# Patient Record
Sex: FEMALE | Race: WHITE | NOT HISPANIC OR LATINO | Employment: OTHER | URBAN - METROPOLITAN AREA
[De-identification: names, ages, dates, MRNs, and addresses within clinical notes are randomized per-mention and may not be internally consistent; named-entity substitution may affect disease eponyms.]

---

## 2019-05-16 NOTE — PATIENT DISCUSSION
CATARACT, OU - VISUALLY SIGNIFICANT. SCHEDULE PHACO WITH IOL OS  FIRST THEN OD  IF VISUAL SYMPTOMS PERSIST. GLASSES RX TO BE GIVEN BY [facility] TO FILL IF DESIRES IN THE EVENT PATIENT DOES NOT PROCEED WITH SURGERY.

## 2019-05-16 NOTE — PATIENT DISCUSSION
New Prescription: prednisolone acetate (prednisolone acetate): drops,suspension: 1% 1 drop three times a day as directed into left eye 05-

## 2019-05-16 NOTE — PATIENT DISCUSSION
Surgery Counseling:  I have discussed the option of glasses versus cataract surgery versus following, It was explained that when vision no longer meets the patient's visual needs and a new prescription for glasses is not likely to improve the patient's visual symptoms, the option of cataract surgery is a reasonable next step. It was explained that there is no guarantee that removing the cataract will improve their visual symptoms; however, it is believed that the cataract is contributing to the patient's visual impairment and surgery may noticeably improve both the visual and functional status of the patient. After this discussion, the patient desires to proceed with cataract surgery with implantation of an intraocular lens to improve their vision for glare symptoms when driving at night.

## 2019-05-16 NOTE — PATIENT DISCUSSION
New Prescription: Prolensa (bromfenac): drops: 0.07% 1 drop every morning as directed into left eye 05-

## 2019-06-12 NOTE — PATIENT DISCUSSION
CATARACT, OD: VISUALLY SIGNIFICANT. SCHEDULE PHACO WITH IOL. IOL MASTER REVIEWED AND INTERPRETED ON H&amp;P SHEET FOR OD TODAY. CONSENTS FOR SECOND EYE DISCUSSED WITH PATIENT TODAY. PATIENT UNDERSTANDS AND HAS NO FURTHER QUESTIONS.

## 2019-06-12 NOTE — PATIENT DISCUSSION
Continue: Besivance (besifloxacin): drops,suspension: 0.6% 1 drop three times a day as directed into left eye 05-

## 2019-06-12 NOTE — PATIENT DISCUSSION
Continue: prednisolone acetate (prednisolone acetate): drops,suspension: 1% 1 drop three times a day as directed into left eye 05-

## 2019-06-12 NOTE — PATIENT DISCUSSION
New Prescription: Vandana Zambrano (brinzolamide-brimonidine): drops,suspension: 1-0.2% 1 drop twice a day as directed into left eye

## 2019-06-12 NOTE — PATIENT DISCUSSION
-Refractive Error Counseling (Declined): The patient has declined the option to have their refractive error corrected at the time of cataract surgery. It was explained to the patient that there is a high probability that they will need glasses for both distance and near vision. It was also explained to the patient that the decision to not have their refractive error corrected at the time of cataract surgery is a matter of convenience, not quality of vision. 112

## 2019-06-12 NOTE — PATIENT DISCUSSION
S/P PHACO W/IOL,OS:  ELEVATED IOP. INSTILLED ONE DROP OF COMBIGAN @ 9:37AM. REPEAT IOP CHECK BY TECHNICIAN @9:50. IOP REDUCED WITH TOPICAL TREATMENT IN OFFICE. ADD SIMBRINZA, 1 GTT BID OS TO CONTROL IOP UNTIL NEXT POST OP VISIT. CONTINUE TO TAPER DROPS AS DIRECTED. DISCONTINUE ANTIBIOTIC IN 1 WEEK. RETURN FOR FOLLOW-UP AS SCHEDULED.

## 2019-06-12 NOTE — PATIENT DISCUSSION
Surgery 2nd Eye Counseling: The patient has noticed an improvement in their visual symptoms in the operative eye. The patient complains of decreased vision in the fellow eye when bowling &amp; driving at night. It was explained to the patient that the decision to proceed with cataract surgery in the fellow eye is entirely a separate decision from the surgical eye. All of the same risks, benefits and alternatives ere reviewed with the patient again. The patient does feel the vision in the non-operative eye is limiting their daily activities and elects to proceed with surgery in the cataract eye.

## 2019-06-26 NOTE — PATIENT DISCUSSION
CONTINUE TO TAPER OTHER TWO DROPS AS DIRECTED BY [facility]. OKAY TO USE REFRESH PF OPTIVE PRN. FOLLOW-UP WITH [facility] IN ONE WEEK.

## 2019-06-26 NOTE — PATIENT DISCUSSION
S/P PHACO W/IOL,OD:  ELEVATED IOP. INSTILLED ONE DROP OF SIMBRINZA  @ 11:01 AM. REPEAT IOP CHECK BY TECHNICIAN @11:23 am. IOP NOT REDUCED SIGNIFICANTLY ENOUGH WITH TOPICAL TREATMENT IN OFFICE. FLUID RELEASED FROM AB EXTERNA INCISION AT SLIT LAMP WITHOUT DIFFICULTY. REPEAT IOP CHECK BY DR. Elina Kan ([id number]). INSTILLED ONE DROP OF ANTIBIOTIC IMMEDIATELY. ADD COMBIGAN BID OD TO CONTROL IOP UNTIL FURTHER INSTRUCTIONS FROM [facility].

## 2020-09-10 ENCOUNTER — PREPPED CHART (OUTPATIENT)
Dept: URBAN - METROPOLITAN AREA CLINIC 30 | Facility: CLINIC | Age: 80
End: 2020-09-10

## 2021-01-04 ASSESSMENT — TONOMETRY
OD_IOP_MMHG: 11
OS_IOP_MMHG: 16

## 2021-01-06 ENCOUNTER — ESTABLISHED PATIENT (OUTPATIENT)
Dept: URBAN - METROPOLITAN AREA CLINIC 30 | Facility: CLINIC | Age: 81
End: 2021-01-06

## 2021-01-06 DIAGNOSIS — H40.1130: ICD-10-CM

## 2021-01-06 PROCEDURE — 92020 GONIOSCOPY: CPT

## 2021-01-06 PROCEDURE — 0509T PATTERN ERG W/I&R: CPT

## 2021-01-06 PROCEDURE — 99213 OFFICE O/P EST LOW 20 MIN: CPT

## 2021-01-06 ASSESSMENT — TONOMETRY
OD_IOP_MMHG: 14
OS_IOP_MMHG: 18
OD_IOP_MMHG: 18
OS_IOP_MMHG: 19

## 2021-04-27 ENCOUNTER — ESTABLISHED PATIENT (OUTPATIENT)
Dept: URBAN - METROPOLITAN AREA CLINIC 30 | Facility: CLINIC | Age: 81
End: 2021-04-27

## 2021-04-27 DIAGNOSIS — H02.88A: ICD-10-CM

## 2021-04-27 DIAGNOSIS — H40.1131: ICD-10-CM

## 2021-04-27 PROCEDURE — 92133 CPTRZD OPH DX IMG PST SGM ON: CPT

## 2021-04-27 PROCEDURE — 99213 OFFICE O/P EST LOW 20 MIN: CPT

## 2021-04-27 ASSESSMENT — TONOMETRY
OS_IOP_MMHG: 15
OD_IOP_MMHG: 14

## 2021-04-27 ASSESSMENT — VISUAL ACUITY
OD_CC: 20/20
OS_CC: 20/20--
OU_CC: 20/20

## 2021-10-26 ENCOUNTER — ESTABLISHED COMPREHENSIVE EXAM (OUTPATIENT)
Dept: URBAN - METROPOLITAN AREA CLINIC 30 | Facility: CLINIC | Age: 81
End: 2021-10-26

## 2021-10-26 DIAGNOSIS — H02.88A: ICD-10-CM

## 2021-10-26 DIAGNOSIS — Z96.1: ICD-10-CM

## 2021-10-26 DIAGNOSIS — H35.3131: ICD-10-CM

## 2021-10-26 DIAGNOSIS — H40.1131: ICD-10-CM

## 2021-10-26 DIAGNOSIS — H43.813: ICD-10-CM

## 2021-10-26 DIAGNOSIS — H00.13: ICD-10-CM

## 2021-10-26 DIAGNOSIS — H02.88B: ICD-10-CM

## 2021-10-26 PROCEDURE — 92250 FUNDUS PHOTOGRAPHY W/I&R: CPT

## 2021-10-26 PROCEDURE — 92083 EXTENDED VISUAL FIELD XM: CPT

## 2021-10-26 PROCEDURE — 99214 OFFICE O/P EST MOD 30 MIN: CPT

## 2021-10-26 ASSESSMENT — TONOMETRY
OS_IOP_MMHG: 16
OD_IOP_MMHG: 13
OD_IOP_MMHG: 12
OS_IOP_MMHG: 16

## 2021-10-26 ASSESSMENT — VISUAL ACUITY
OD_SC: 20/25+1
OS_SC: 20/25

## 2022-02-03 ENCOUNTER — COMPREHENSIVE EXAM (OUTPATIENT)
Dept: URBAN - METROPOLITAN AREA CLINIC 30 | Facility: CLINIC | Age: 82
End: 2022-02-03

## 2022-02-03 DIAGNOSIS — H43.813: ICD-10-CM

## 2022-02-03 DIAGNOSIS — Z96.1: ICD-10-CM

## 2022-02-03 DIAGNOSIS — H02.88A: ICD-10-CM

## 2022-02-03 DIAGNOSIS — H35.3131: ICD-10-CM

## 2022-02-03 DIAGNOSIS — H40.1131: ICD-10-CM

## 2022-02-03 DIAGNOSIS — H02.88B: ICD-10-CM

## 2022-02-03 PROCEDURE — 92083 EXTENDED VISUAL FIELD XM: CPT

## 2022-02-03 PROCEDURE — 99214 OFFICE O/P EST MOD 30 MIN: CPT

## 2022-02-03 PROCEDURE — 92133 CPTRZD OPH DX IMG PST SGM ON: CPT

## 2022-02-03 ASSESSMENT — TONOMETRY
OD_IOP_MMHG: 11
OS_IOP_MMHG: 16

## 2022-02-03 ASSESSMENT — VISUAL ACUITY
OD_CC: 20/20-1
OS_CC: 20/20-2

## 2022-09-01 ENCOUNTER — COMPREHENSIVE EXAM (OUTPATIENT)
Dept: URBAN - METROPOLITAN AREA CLINIC 30 | Facility: CLINIC | Age: 82
End: 2022-09-01

## 2022-09-01 DIAGNOSIS — H43.813: ICD-10-CM

## 2022-09-01 DIAGNOSIS — H02.88A: ICD-10-CM

## 2022-09-01 DIAGNOSIS — H40.1131: ICD-10-CM

## 2022-09-01 DIAGNOSIS — H02.88B: ICD-10-CM

## 2022-09-01 DIAGNOSIS — H35.373: ICD-10-CM

## 2022-09-01 DIAGNOSIS — H35.3131: ICD-10-CM

## 2022-09-01 PROCEDURE — 99214 OFFICE O/P EST MOD 30 MIN: CPT

## 2022-09-01 PROCEDURE — 92083 EXTENDED VISUAL FIELD XM: CPT

## 2022-09-01 PROCEDURE — 92250 FUNDUS PHOTOGRAPHY W/I&R: CPT

## 2022-09-01 ASSESSMENT — VISUAL ACUITY
OS_CC: 20/20-1
OD_CC: 20/20-2

## 2022-09-01 ASSESSMENT — TONOMETRY
OS_IOP_MMHG: 13
OD_IOP_MMHG: 12

## 2023-05-20 NOTE — PATIENT DISCUSSION
New Prescription: Besivance (besifloxacin): drops,suspension: 0.6% 1 drop three times a day as directed into left eye 05- No

## 2023-05-30 ASSESSMENT — KERATOMETRY
OD_K1POWER_DIOPTERS: 43.00
OD_K2POWER_DIOPTERS: 41.50
OS_K1POWER_DIOPTERS: 44.00
OS_AXISANGLE2_DEGREES: 80
OS_AXISANGLE_DEGREES: 170
OS_K2POWER_DIOPTERS: 41.75
OD_AXISANGLE2_DEGREES: 95
OD_AXISANGLE_DEGREES: 5

## 2023-06-06 ENCOUNTER — ESTABLISHED PATIENT (OUTPATIENT)
Facility: LOCATION | Age: 83
End: 2023-06-06

## 2023-06-06 DIAGNOSIS — H35.373: ICD-10-CM

## 2023-06-06 DIAGNOSIS — H35.3131: ICD-10-CM

## 2023-06-06 PROCEDURE — 0509T PATTERN ERG W/I&R: CPT

## 2023-06-06 PROCEDURE — 92134 CPTRZ OPH DX IMG PST SGM RTA: CPT

## 2023-06-06 PROCEDURE — 99213 OFFICE O/P EST LOW 20 MIN: CPT

## 2023-06-06 ASSESSMENT — VISUAL ACUITY
OD_CC: 20/20-2
OS_CC: 20/25+2

## 2023-06-06 ASSESSMENT — TONOMETRY
OD_IOP_MMHG: 14
OS_IOP_MMHG: 16

## 2023-09-07 ENCOUNTER — FOLLOW UP (OUTPATIENT)
Facility: LOCATION | Age: 83
End: 2023-09-07

## 2023-09-07 DIAGNOSIS — H43.813: ICD-10-CM

## 2023-09-07 DIAGNOSIS — H35.373: ICD-10-CM

## 2023-09-07 DIAGNOSIS — H02.88A: ICD-10-CM

## 2023-09-07 DIAGNOSIS — H40.1131: ICD-10-CM

## 2023-09-07 DIAGNOSIS — H35.3131: ICD-10-CM

## 2023-09-07 DIAGNOSIS — D31.31: ICD-10-CM

## 2023-09-07 PROCEDURE — 92134 CPTRZ OPH DX IMG PST SGM RTA: CPT

## 2023-09-07 PROCEDURE — 92014 COMPRE OPH EXAM EST PT 1/>: CPT

## 2023-09-07 PROCEDURE — 92083 EXTENDED VISUAL FIELD XM: CPT

## 2023-09-07 ASSESSMENT — VISUAL ACUITY
OS_CC: 20/20-1
OD_CC: J1+
OD_CC: 20/20-1
OS_CC: J1+

## 2023-09-07 ASSESSMENT — KERATOMETRY
OS_AXISANGLE2_DEGREES: 80
OD_K1POWER_DIOPTERS: 43.00
OD_AXISANGLE_DEGREES: 5
OS_K1POWER_DIOPTERS: 44.00
OS_AXISANGLE_DEGREES: 170
OS_K2POWER_DIOPTERS: 41.75
OD_AXISANGLE2_DEGREES: 95
OD_K2POWER_DIOPTERS: 41.50

## 2023-09-07 ASSESSMENT — TONOMETRY
OS_IOP_MMHG: 12
OD_IOP_MMHG: 11

## 2023-11-14 ENCOUNTER — ESTABLISHED PATIENT (OUTPATIENT)
Dept: URBAN - METROPOLITAN AREA CLINIC 44 | Facility: CLINIC | Age: 83
End: 2023-11-14

## 2023-11-14 DIAGNOSIS — C44.1122: ICD-10-CM

## 2023-11-14 PROCEDURE — 99213 OFFICE O/P EST LOW 20 MIN: CPT

## 2023-11-14 PROCEDURE — 92285 EXTERNAL OCULAR PHOTOGRAPHY: CPT

## 2023-11-14 ASSESSMENT — KERATOMETRY
OS_K2POWER_DIOPTERS: 41.75
OD_AXISANGLE_DEGREES: 5
OD_K2POWER_DIOPTERS: 41.50
OS_AXISANGLE_DEGREES: 170
OD_AXISANGLE2_DEGREES: 95
OD_K1POWER_DIOPTERS: 43.00
OS_AXISANGLE2_DEGREES: 80
OS_K1POWER_DIOPTERS: 44.00

## 2023-11-14 ASSESSMENT — VISUAL ACUITY
OS_CC: 20/20-1
OD_CC: 20/20-1

## 2024-01-16 ENCOUNTER — FOLLOW UP (OUTPATIENT)
Facility: LOCATION | Age: 84
End: 2024-01-16

## 2024-01-16 DIAGNOSIS — H40.1131: ICD-10-CM

## 2024-01-16 DIAGNOSIS — H47.20: ICD-10-CM

## 2024-01-16 PROCEDURE — 99213 OFFICE O/P EST LOW 20 MIN: CPT

## 2024-01-16 PROCEDURE — 92273 FULL FIELD ERG W/I&R: CPT

## 2024-01-16 PROCEDURE — 92133 CPTRZD OPH DX IMG PST SGM ON: CPT

## 2024-01-16 ASSESSMENT — TONOMETRY
OD_IOP_MMHG: 12
OS_IOP_MMHG: 15

## 2024-01-16 ASSESSMENT — KERATOMETRY
OD_K1POWER_DIOPTERS: 42.75
OD_AXISANGLE2_DEGREES: 100
OS_AXISANGLE2_DEGREES: 80
OD_AXISANGLE_DEGREES: 10
OS_K2POWER_DIOPTERS: 41.75
OS_K1POWER_DIOPTERS: 44.00
OS_AXISANGLE_DEGREES: 170
OD_K2POWER_DIOPTERS: 41.00

## 2024-01-16 ASSESSMENT — VISUAL ACUITY
OS_CC: J1+
OD_CC: J1+
OD_CC: 20/20
OS_CC: 20/20

## 2024-01-18 ENCOUNTER — PRE-OP/H&P (OUTPATIENT)
Dept: URBAN - METROPOLITAN AREA CLINIC 44 | Facility: CLINIC | Age: 84
End: 2024-01-18

## 2024-01-18 DIAGNOSIS — C44.1122: ICD-10-CM

## 2024-01-18 PROCEDURE — 99211HP H&P OFFICE/OUTPATIENT VISIT, EST

## 2024-01-18 ASSESSMENT — KERATOMETRY
OS_K2POWER_DIOPTERS: 41.75
OD_K1POWER_DIOPTERS: 43.00
OD_AXISANGLE_DEGREES: 5
OD_AXISANGLE2_DEGREES: 95
OD_K2POWER_DIOPTERS: 41.50
OS_K1POWER_DIOPTERS: 44.00
OS_AXISANGLE2_DEGREES: 80
OS_AXISANGLE_DEGREES: 170

## 2024-02-13 ENCOUNTER — SURGERY/PROCEDURE (OUTPATIENT)
Facility: LOCATION | Age: 84
End: 2024-02-13

## 2024-02-13 ENCOUNTER — PRE-OP/H&P (OUTPATIENT)
Facility: LOCATION | Age: 84
End: 2024-02-13

## 2024-02-13 DIAGNOSIS — C44.1122: ICD-10-CM

## 2024-02-13 PROCEDURE — 67950 REVISION OF EYELID: CPT

## 2024-02-13 PROCEDURE — 14061 TIS TRNFR E/N/E/L10.1-30SQCM: CPT

## 2024-02-13 PROCEDURE — 99211T TECH SERVICE

## 2024-02-13 ASSESSMENT — KERATOMETRY
OD_K1POWER_DIOPTERS: 43.00
OS_K1POWER_DIOPTERS: 44.00
OS_AXISANGLE_DEGREES: 170
OD_K2POWER_DIOPTERS: 41.50
OD_AXISANGLE_DEGREES: 5
OS_K2POWER_DIOPTERS: 41.75
OD_AXISANGLE2_DEGREES: 95
OS_AXISANGLE2_DEGREES: 80

## 2024-02-14 ASSESSMENT — KERATOMETRY
OS_AXISANGLE2_DEGREES: 80
OS_AXISANGLE_DEGREES: 170
OS_K1POWER_DIOPTERS: 44.00
OS_K2POWER_DIOPTERS: 41.75
OD_K1POWER_DIOPTERS: 43.00
OD_K2POWER_DIOPTERS: 41.50
OD_AXISANGLE2_DEGREES: 95
OD_AXISANGLE_DEGREES: 5

## 2024-02-27 ENCOUNTER — POST-OP (OUTPATIENT)
Dept: URBAN - METROPOLITAN AREA CLINIC 39 | Facility: CLINIC | Age: 84
End: 2024-02-27

## 2024-02-27 DIAGNOSIS — Z98.890: ICD-10-CM

## 2024-02-27 PROCEDURE — 99024 POSTOP FOLLOW-UP VISIT: CPT

## 2024-02-27 ASSESSMENT — KERATOMETRY
OS_K2POWER_DIOPTERS: 41.75
OS_K1POWER_DIOPTERS: 44.00
OS_AXISANGLE_DEGREES: 170
OS_AXISANGLE2_DEGREES: 80
OD_K1POWER_DIOPTERS: 43.00
OD_K2POWER_DIOPTERS: 41.50
OD_AXISANGLE2_DEGREES: 95
OD_AXISANGLE_DEGREES: 5

## 2024-02-27 ASSESSMENT — VISUAL ACUITY
OD_CC: 20/30
OS_CC: 20/25

## 2024-05-13 ASSESSMENT — KERATOMETRY
OD_AXISANGLE2_DEGREES: 95
OD_K1POWER_DIOPTERS: 43.00
OD_K2POWER_DIOPTERS: 41.50
OS_AXISANGLE2_DEGREES: 80
OD_AXISANGLE_DEGREES: 5
OS_K1POWER_DIOPTERS: 44.00
OS_K2POWER_DIOPTERS: 41.75
OS_AXISANGLE_DEGREES: 170

## 2024-05-15 ENCOUNTER — COMPREHENSIVE EXAM (OUTPATIENT)
Facility: LOCATION | Age: 84
End: 2024-05-15

## 2024-05-15 DIAGNOSIS — H02.88A: ICD-10-CM

## 2024-05-15 DIAGNOSIS — H40.1131: ICD-10-CM

## 2024-05-15 DIAGNOSIS — H02.88B: ICD-10-CM

## 2024-05-15 DIAGNOSIS — H35.3131: ICD-10-CM

## 2024-05-15 DIAGNOSIS — H43.813: ICD-10-CM

## 2024-05-15 PROCEDURE — 92134 CPTRZ OPH DX IMG PST SGM RTA: CPT

## 2024-05-15 PROCEDURE — 92273 FULL FIELD ERG W/I&R: CPT

## 2024-05-15 PROCEDURE — 92014 COMPRE OPH EXAM EST PT 1/>: CPT

## 2024-05-15 ASSESSMENT — VISUAL ACUITY
OS_CC: 20/20
OD_CC: 20/20

## 2024-05-15 ASSESSMENT — TONOMETRY
OD_IOP_MMHG: 12
OS_IOP_MMHG: 14

## 2024-09-17 ENCOUNTER — COMPREHENSIVE EXAM (OUTPATIENT)
Facility: LOCATION | Age: 84
End: 2024-09-17

## 2024-09-17 DIAGNOSIS — H40.1131: ICD-10-CM

## 2024-09-17 DIAGNOSIS — H02.88A: ICD-10-CM

## 2024-09-17 DIAGNOSIS — H43.813: ICD-10-CM

## 2024-09-17 DIAGNOSIS — H35.09: ICD-10-CM

## 2024-09-17 DIAGNOSIS — H35.371: ICD-10-CM

## 2024-09-17 DIAGNOSIS — H02.88B: ICD-10-CM

## 2024-09-17 DIAGNOSIS — H35.3131: ICD-10-CM

## 2024-09-17 PROCEDURE — 92250 FUNDUS PHOTOGRAPHY W/I&R: CPT

## 2024-09-17 PROCEDURE — 99214 OFFICE O/P EST MOD 30 MIN: CPT

## 2024-09-17 PROCEDURE — 92083 EXTENDED VISUAL FIELD XM: CPT

## 2025-01-15 ENCOUNTER — COMPREHENSIVE EXAM (OUTPATIENT)
Age: 85
End: 2025-01-15

## 2025-01-15 DIAGNOSIS — H35.371: ICD-10-CM

## 2025-01-15 DIAGNOSIS — H47.20: ICD-10-CM

## 2025-01-15 DIAGNOSIS — H40.1131: ICD-10-CM

## 2025-01-15 DIAGNOSIS — H35.09: ICD-10-CM

## 2025-01-15 DIAGNOSIS — H43.813: ICD-10-CM

## 2025-01-15 PROCEDURE — 92014 COMPRE OPH EXAM EST PT 1/>: CPT

## 2025-01-15 PROCEDURE — 92020 GONIOSCOPY: CPT

## 2025-01-15 PROCEDURE — 92273 FULL FIELD ERG W/I&R: CPT

## 2025-01-15 PROCEDURE — 92133 CPTRZD OPH DX IMG PST SGM ON: CPT

## 2025-05-14 ENCOUNTER — FOLLOW UP (OUTPATIENT)
Age: 85
End: 2025-05-14

## 2025-05-14 DIAGNOSIS — H35.3131: ICD-10-CM

## 2025-05-14 DIAGNOSIS — H02.88A: ICD-10-CM

## 2025-05-14 DIAGNOSIS — H02.88B: ICD-10-CM

## 2025-05-14 DIAGNOSIS — H35.371: ICD-10-CM

## 2025-05-14 DIAGNOSIS — H35.09: ICD-10-CM

## 2025-05-14 PROCEDURE — 92273 FULL FIELD ERG W/I&R: CPT

## 2025-05-14 PROCEDURE — 99214 OFFICE O/P EST MOD 30 MIN: CPT

## 2025-05-14 PROCEDURE — 92137 CPTRZ OPH IMG PST SG RTA OCT: CPT
